# Patient Record
Sex: MALE | Race: WHITE
[De-identification: names, ages, dates, MRNs, and addresses within clinical notes are randomized per-mention and may not be internally consistent; named-entity substitution may affect disease eponyms.]

---

## 2019-03-02 ENCOUNTER — HOSPITAL ENCOUNTER (EMERGENCY)
Dept: HOSPITAL 74 - JER | Age: 39
Discharge: HOME | End: 2019-03-02
Payer: COMMERCIAL

## 2019-03-02 VITALS — TEMPERATURE: 97.1 F

## 2019-03-02 VITALS — HEART RATE: 72 BPM | SYSTOLIC BLOOD PRESSURE: 127 MMHG | DIASTOLIC BLOOD PRESSURE: 68 MMHG

## 2019-03-02 VITALS — BODY MASS INDEX: 30.8 KG/M2

## 2019-03-02 DIAGNOSIS — M62.830: Primary | ICD-10-CM

## 2019-03-02 PROCEDURE — 3E0233Z INTRODUCTION OF ANTI-INFLAMMATORY INTO MUSCLE, PERCUTANEOUS APPROACH: ICD-10-PCS | Performed by: STUDENT IN AN ORGANIZED HEALTH CARE EDUCATION/TRAINING PROGRAM

## 2019-03-02 NOTE — PDOC
History of Present Illness





- General


Chief Complaint: Back Pain


Stated Complaint: BACK PAIN


Time Seen by Provider: 03/02/19 07:40


History Source: Patient


Exam Limitations: No Limitations





- History of Present Illness


Initial Comments: 





03/02/19 08:54


39y M no known pmhx presents with R back pain for the past 2 days. Pt states he 

was doing some heavy liifting on wed and was ok, but on thursday had mild R 

lower back pain, thathas been gradually worsening. He went to his ortho doc on 

friday and had a cortisone shot with mild improvement. Pt notes this morning 

when he woke up he could barely stand due to the pain so called the ambulance 

to bring him. 


no associated upp back pain, recent trauma/falls, cp, sob, nv


h denies fever/chills, numbness/tingling/weakness, urinary or bowel 

incontinence.


Pain is non radiating, worse when he moves around. improves with rest and is 

intermittent and smasmotic in nature.





social: denies IVDU











Past History





- Past Medical History


Allergies/Adverse Reactions: 


 Allergies











Allergy/AdvReac Type Severity Reaction Status Date / Time


 


No Known Allergies Allergy   Verified 03/02/19 07:08











Home Medications: 


Ambulatory Orders





Acetaminophen [Tylenol] 650 mg PO QID PRN #650 mg 03/02/19 


Cyclobenzaprine HCl [Flexeril -] 10 mg PO TID PRN #21 tablet 03/02/19 


Ibuprofen 400 mg PO QID PRN #30 tablet 03/02/19 


Metaxalone [Skelaxin] 800 mg PO TID PRN #15 tablet 03/02/19 


Tramadol HCl 50 mg PO QID PRN #12 tablet MDD 4 03/02/19 











- Suicide/Smoking/Psychosocial Hx


Smoking History: Never smoked


Have you smoked in the past 12 months: No


Information on smoking cessation initiated: No


Hx Alcohol Use: No


Drug/Substance Use Hx: No


Substance Use Type: None





**Review of Systems





- Review of Systems


Able to Perform ROS?: Yes


Comments:: 





03/02/19 09:11


Constitutional - no reported Fever, Chills, 


Respiratory: no reported  sob,


Cardiac: no reported chest pain, 


Abd/GI: no reported abd pain, nausea, vomiting, blood per rectum, melena, 

diarrhea


Musculskelatal - +  back pain no reportedjoint swelling


skin - no reported bruising, erythema, rash


neurological: no reported  numbness, focal weakness, tingling








*Physical Exam





- Vital Signs


 Last Vital Signs











Temp Pulse Resp BP Pulse Ox


 


 97.1 F L  78   19   135/64   97 


 


 03/02/19 06:55  03/02/19 06:55  03/02/19 06:55  03/02/19 06:55  03/02/19 06:55














- Physical Exam


Comments: 





03/02/19 09:12


GENERAL: The patient is awake, alert, and fully oriented, Nontoxic - in no 

acute distress.


ABDOMEN: Soft, nontender, normoactive bowel sounds.  No guarding, no rebound.  

. No CVA tenderness


EXTREMITIES: Normal range of motion, no edema.  No clubbing or cyanosis. No 

cords, erythema, or tenderness.


MSK: ttp R lower back, +hypertrophy of R lumbar paraspnal muscles that exactl 

yreproduces his pain, (pain and ttp only present when he sits/moves), no focal 

ttp at thoracic, cervical, lumbar spine


NEUROLOGICAL: No facial assymetry, Normal speech, mnoivng all 4 ex 

spontaneously and symmetrically, sensation intact b/l


SKIN: Warm, Dry, normal turgor,








Moderate Sedation





- Procedure Monitoring


Vital Signs: 


Procedure Monitoring Vital Signs











Temperature  97.1 F L  03/02/19 06:55


 


Pulse Rate  78   03/02/19 06:55


 


Respiratory Rate  19   03/02/19 06:55


 


Blood Pressure  135/64   03/02/19 06:55


 


O2 Sat by Pulse Oximetry (%)  97   03/02/19 06:55











Medical Decision Making





- Medical Decision Making





03/02/19 09:13


suspect muscle spasms


no signs of cauda equina or spinal epidurla abscess


no focal ttp to suggest fx





will give toradol and valium








03/02/19 12:04


pt feels slightly improved at rest, but when he sits up he still has sever 

spasms of his back


will give tylenol and flexeril





will reassess








03/02/19 14:04


pt still in signficant amount of pain


declines narcotics though


will give another dose of toradol IM (pt is 6 hrs after last dose) and dose of 

teramadol








03/02/19 16:38


The patient is feeling slightly better is able to stand on his own but able to 

ambulate due to the pain the patient has refused opioids in the past due to 

concern for addiction, he he states he is willing to give it a try L give the 

patient a dose of oxycodone as urinary had a tramadol earlier. 








03/02/19 17:47


pt feelin gmuch better


able to tolerate standing up without asistance and abmulating


will dc the with motrin/tylenol, skelaxin and tramadol


will have him fu with pmd


reutnr precautions were discused





A portion of this note was documented by scribe services under my direction. I 

have reviewed the details of the note, within reason, and agree with the 

documentation with the following case summary and management plan written by me





*DC/Admit/Observation/Transfer


Diagnosis at time of Disposition: 


 Back muscle spasm








- Discharge Dispostion


Disposition: HOME


Condition at time of disposition: Improved


Decision to Admit order: No





- Prescriptions


Prescriptions: 


Acetaminophen [Tylenol] 650 mg PO QID PRN #650 mg


 PRN Reason: Back Pain


Cyclobenzaprine HCl [Flexeril -] 10 mg PO TID PRN #21 tablet


 PRN Reason: Back Pain


Ibuprofen 400 mg PO QID PRN #30 tablet


 PRN Reason: Pain


Metaxalone [Skelaxin] 800 mg PO TID PRN #15 tablet


 PRN Reason: Back Pain


Tramadol HCl 50 mg PO QID PRN #12 tablet MDD 4


 PRN Reason: Pain





- Referrals


Referrals: 


[facility] [place] [Provider Group]





- Patient Instructions


Printed Discharge Instructions:  DI for Back Spasm


Additional Instructions: 


Return to the emergency department immediately with ANY new, persistent or 

worsening symptoms including numbness, tingling, weakness, fevers or any other 

concerns. 





Take ibuprofen (400mg)/tylenol(650mg) every 6 hours for 2 days.


Take the skelaxin twice daily if you still have pain/discomfort. Caution in 

using Valium as it may make you sleepy. Do not drive or put yourself in any 

position where you would be in danger.


Apply heat to your sore muscles. 





You MUST call and follow up with your doctor tomorrow for further evaluation of 

your symptoms. Your emergency department visit is not complete without a 

followup with your doctor for reevaluation.. Results were discussed with you. 

Please make sure your doctor reviews the results of your emergency evaluation.





Print Language: ENGLISH





- Post Discharge Activity